# Patient Record
Sex: MALE | Race: WHITE | NOT HISPANIC OR LATINO | Employment: FULL TIME | URBAN - NONMETROPOLITAN AREA
[De-identification: names, ages, dates, MRNs, and addresses within clinical notes are randomized per-mention and may not be internally consistent; named-entity substitution may affect disease eponyms.]

---

## 2023-01-20 PROBLEM — F41.9 ANXIETY: Status: ACTIVE | Noted: 2023-01-20

## 2023-01-20 PROBLEM — R19.7 DIARRHEA: Status: ACTIVE | Noted: 2023-01-20

## 2023-01-20 PROBLEM — F41.0 PANIC ATTACK: Status: ACTIVE | Noted: 2023-01-20

## 2023-01-20 RX ORDER — ESCITALOPRAM OXALATE 5 MG/1
1 TABLET ORAL DAILY
COMMUNITY
Start: 2020-06-30 | End: 2023-03-07 | Stop reason: SDUPTHER

## 2023-01-20 RX ORDER — DESVENLAFAXINE 100 MG/1
1 TABLET, EXTENDED RELEASE ORAL DAILY
COMMUNITY
Start: 2019-06-04 | End: 2023-03-07 | Stop reason: SDUPTHER

## 2023-03-07 ENCOUNTER — OFFICE VISIT (OUTPATIENT)
Dept: PRIMARY CARE | Facility: CLINIC | Age: 28
End: 2023-03-07
Payer: COMMERCIAL

## 2023-03-07 VITALS
OXYGEN SATURATION: 98 % | HEART RATE: 61 BPM | WEIGHT: 179.6 LBS | BODY MASS INDEX: 914.6 KG/M2 | TEMPERATURE: 97.6 F | SYSTOLIC BLOOD PRESSURE: 116 MMHG | DIASTOLIC BLOOD PRESSURE: 60 MMHG

## 2023-03-07 DIAGNOSIS — Z00.00 WELL ADULT EXAM: Primary | ICD-10-CM

## 2023-03-07 DIAGNOSIS — F41.9 ANXIETY: ICD-10-CM

## 2023-03-07 DIAGNOSIS — F41.0 PANIC ATTACK: ICD-10-CM

## 2023-03-07 PROBLEM — R19.7 DIARRHEA: Status: RESOLVED | Noted: 2023-01-20 | Resolved: 2023-03-07

## 2023-03-07 PROCEDURE — 99214 OFFICE O/P EST MOD 30 MIN: CPT | Performed by: FAMILY MEDICINE

## 2023-03-07 PROCEDURE — 1036F TOBACCO NON-USER: CPT | Performed by: FAMILY MEDICINE

## 2023-03-07 RX ORDER — DESVENLAFAXINE 100 MG/1
100 TABLET, EXTENDED RELEASE ORAL DAILY
Qty: 30 TABLET | Refills: 3 | Status: SHIPPED | OUTPATIENT
Start: 2023-03-07 | End: 2023-03-27 | Stop reason: ALTCHOICE

## 2023-03-07 RX ORDER — DESVENLAFAXINE 100 MG/1
100 TABLET, EXTENDED RELEASE ORAL DAILY
Qty: 30 TABLET | Refills: 3 | Status: SHIPPED | OUTPATIENT
Start: 2023-03-07 | End: 2023-03-07 | Stop reason: SDUPTHER

## 2023-03-07 RX ORDER — DESVENLAFAXINE SUCCINATE 25 MG/1
75 TABLET, EXTENDED RELEASE ORAL DAILY
Qty: 90 TABLET | Refills: 11 | Status: SHIPPED | OUTPATIENT
Start: 2023-03-07 | End: 2023-03-07 | Stop reason: SDUPTHER

## 2023-03-07 RX ORDER — ESCITALOPRAM OXALATE 5 MG/1
5 TABLET ORAL DAILY
Qty: 30 TABLET | Refills: 11 | Status: SHIPPED | OUTPATIENT
Start: 2023-03-07 | End: 2023-03-07 | Stop reason: SDUPTHER

## 2023-03-07 RX ORDER — ESCITALOPRAM OXALATE 5 MG/1
5 TABLET ORAL DAILY
Qty: 30 TABLET | Refills: 11 | Status: SHIPPED | OUTPATIENT
Start: 2023-03-07 | End: 2023-09-12 | Stop reason: SDUPTHER

## 2023-03-07 RX ORDER — DESVENLAFAXINE SUCCINATE 25 MG/1
75 TABLET, EXTENDED RELEASE ORAL DAILY
Qty: 90 TABLET | Refills: 11 | Status: SHIPPED | OUTPATIENT
Start: 2023-03-07 | End: 2023-03-27 | Stop reason: SDUPTHER

## 2023-03-07 ASSESSMENT — PATIENT HEALTH QUESTIONNAIRE - PHQ9
1. LITTLE INTEREST OR PLEASURE IN DOING THINGS: NOT AT ALL
SUM OF ALL RESPONSES TO PHQ9 QUESTIONS 1 AND 2: 0
2. FEELING DOWN, DEPRESSED OR HOPELESS: NOT AT ALL

## 2023-03-07 ASSESSMENT — ANXIETY QUESTIONNAIRES
GAD7 TOTAL SCORE: 0
6. BECOMING EASILY ANNOYED OR IRRITABLE: NOT AT ALL
3. WORRYING TOO MUCH ABOUT DIFFERENT THINGS: NOT AT ALL
4. TROUBLE RELAXING: NOT AT ALL
IF YOU CHECKED OFF ANY PROBLEMS ON THIS QUESTIONNAIRE, HOW DIFFICULT HAVE THESE PROBLEMS MADE IT FOR YOU TO DO YOUR WORK, TAKE CARE OF THINGS AT HOME, OR GET ALONG WITH OTHER PEOPLE: NOT DIFFICULT AT ALL
7. FEELING AFRAID AS IF SOMETHING AWFUL MIGHT HAPPEN: NOT AT ALL
2. NOT BEING ABLE TO STOP OR CONTROL WORRYING: NOT AT ALL
5. BEING SO RESTLESS THAT IT IS HARD TO SIT STILL: NOT AT ALL
1. FEELING NERVOUS, ANXIOUS, OR ON EDGE: NOT AT ALL

## 2023-03-07 NOTE — PROGRESS NOTES
Subjective   Patient ID: Burgess Moody is a 27 y.o. male who presents for Follow-up (6 month followup anxiety) and Anxiety.    Anxiety        Patient reports that he is managing stress much better and would like to discuss discontinuing his anxiety medication.     Patient denies any chest pain, chest tightness, or chest pressure.     Patient reports that he is exercising regularly.     Reviewed and discussed medication list.     Review of Systems   All other systems reviewed and are negative.      Objective   /60 (BP Location: Left arm, Patient Position: Sitting, BP Cuff Size: Adult)   Pulse 61   Temp 36.4 °C (97.6 °F) (Temporal)   Wt 81.5 kg (179 lb 9.6 oz)   SpO2 98%   .60 kg/m²     Physical Exam  Vitals reviewed.         Assessment/Plan   Problem List Items Addressed This Visit          Other    Anxiety    Relevant Medications    desvenlafaxine 100 mg 24 hr tablet    desvenlafaxine succinate (Pristiq) 25 mg 24 hour tablet    escitalopram (Lexapro) 5 mg tablet    Panic attack     Other Visit Diagnoses       Well adult exam    -  Primary    Relevant Orders    CBC and Auto Differential    Comprehensive Metabolic Panel    Hemoglobin A1C    Lipid Panel    Vitamin D 1,25 Dihydroxy    TSH with reflex to Free T4 if abnormal    Follow Up In [facility] - PCP             1.  Situational anxiety    We had a good discussion regards to your symptoms.    Currently taking Pristiq 100 mg a day and Lexapro 5 mg a day.  Symptoms are very well controlled.    We had a good discussion in regards to weaning off medications.    I am recommending that you start weaning the Pristiq taking 100 mg 1 day and the next day you will take 75 mg.  You will do this for 2 weeks and send me a message.  During that time I would stay on the 5 mg of Lexapro.    During that time it is very important that you exercise 30 to 60 minutes getting a heart rate up.  Try to be outside for at least 30 minutes and natural sunlight  every day as well.  Continue getting at least 6 hours of sleep at night.  Continue to avoid high amounts of alcohol or caffeine during this period of time.  And continue with daily prayer and meditation.  Please contact me in 2 weeks with an update.  If you are going well at this lower dose we will switch down to 75 mg every day for 2 weeks still doing well we will then rotate 75 mg with 50 mg for 2 weeks and still doing well go down to 50 mg every day for 2 weeks we will need to be touching base with a message every 2 weeks.  Certainly if you are unable to wean completely down but we go to a lower dose at this time I think that would still be successful.    If you otherwise stay healthy I will see you back in 6 months but we will have either phone conversations or [employer] messages every 2 weeks going forward.     I, Senthil Mata attest that this note for 3/7/2023 accurately reflects documentation that I made (or my scribe made at my direction) in my capacity as Senthil Mata when I treated Burgess Moody.

## 2023-03-07 NOTE — PROGRESS NOTES
Mr. Burgess Moody is a 28 y/o male presenting for 6-month medication follow-up.     Patient reports that he is handing stress much better and would like to discuss discontinuing his anxiety medication.

## 2023-03-07 NOTE — PATIENT INSTRUCTIONS
1.  Situational anxiety    We had a good discussion regards to your symptoms.    Currently taking Pristiq 100 mg a day and Lexapro 5 mg a day.  Symptoms are very well controlled.    We had a good discussion in regards to weaning off medications.    I am recommending that you start weaning the Pristiq taking 100 mg 1 day and the next day you will take 75 mg.  You will do this for 2 weeks and send me a message.  During that time I would stay on the 5 mg of Lexapro.    During that time it is very important that you exercise 30 to 60 minutes getting a heart rate up.  Try to be outside for at least 30 minutes and natural sunlight every day as well.  Continue getting at least 6 hours of sleep at night.  Continue to avoid high amounts of alcohol or caffeine during this period of time.  And continue with daily prayer and meditation.  Please contact me in 2 weeks with an update.  If you are going well at this lower dose we will switch down to 75 mg every day for 2 weeks still doing well we will then rotate 75 mg with 50 mg for 2 weeks and still doing well go down to 50 mg every day for 2 weeks we will need to be touching base with a message every 2 weeks.  Certainly if you are unable to wean completely down but we go to a lower dose at this time I think that would still be successful.    If you otherwise stay healthy I will see you back in 6 months but we will have either phone conversations or [employer] messages every 2 weeks going

## 2023-03-27 ENCOUNTER — TELEPHONE (OUTPATIENT)
Dept: PRIMARY CARE | Facility: CLINIC | Age: 28
End: 2023-03-27

## 2023-03-27 DIAGNOSIS — F41.9 ANXIETY: ICD-10-CM

## 2023-03-27 RX ORDER — DESVENLAFAXINE SUCCINATE 25 MG/1
50 TABLET, EXTENDED RELEASE ORAL DAILY
Qty: 60 TABLET | Refills: 3 | Status: SHIPPED | OUTPATIENT
Start: 2023-03-27 | End: 2023-03-27 | Stop reason: SDUPTHER

## 2023-03-27 RX ORDER — DESVENLAFAXINE 50 MG/1
50 TABLET, EXTENDED RELEASE ORAL DAILY
Qty: 30 TABLET | Refills: 3 | Status: SHIPPED | OUTPATIENT
Start: 2023-03-27 | End: 2023-07-18

## 2023-03-27 NOTE — TELEPHONE ENCOUNTER
RICARDO Koo -    Desvenlafaxine 50 mg 1 every day - # 30 days (pt said last visit he is tapering down)

## 2023-04-17 ENCOUNTER — TELEPHONE (OUTPATIENT)
Dept: PRIMARY CARE | Facility: CLINIC | Age: 28
End: 2023-04-17
Payer: COMMERCIAL

## 2023-04-17 NOTE — TELEPHONE ENCOUNTER
The patient states he has been weaning off Desvenlafaxine for a month. The patient states he has been having side effects he wants to discuss with the provider. He can be reached at the number listed

## 2023-04-18 NOTE — TELEPHONE ENCOUNTER
I spoke with the patient  He is having side effects on the lower dose of the medications.  He is on 50 mg once a day and he is having increase stress and more emotional.  He is worrying about a lot of things.  I am recommending he re start 75 mg once a day send me a message in 2-3 weeks  If he needs a refill he let me know,

## 2023-07-18 DIAGNOSIS — F41.9 ANXIETY: ICD-10-CM

## 2023-07-18 RX ORDER — DESVENLAFAXINE SUCCINATE 50 MG/1
TABLET, EXTENDED RELEASE ORAL
Qty: 30 TABLET | Refills: 3 | Status: SHIPPED | OUTPATIENT
Start: 2023-07-18 | End: 2023-09-12 | Stop reason: SDUPTHER

## 2023-09-12 ENCOUNTER — OFFICE VISIT (OUTPATIENT)
Dept: PRIMARY CARE | Facility: CLINIC | Age: 28
End: 2023-09-12
Payer: COMMERCIAL

## 2023-09-12 VITALS
WEIGHT: 181.3 LBS | OXYGEN SATURATION: 97 % | HEART RATE: 65 BPM | TEMPERATURE: 98 F | BODY MASS INDEX: 923.26 KG/M2 | SYSTOLIC BLOOD PRESSURE: 102 MMHG | DIASTOLIC BLOOD PRESSURE: 65 MMHG

## 2023-09-12 DIAGNOSIS — Z00.00 WELL ADULT EXAM: ICD-10-CM

## 2023-09-12 DIAGNOSIS — F41.0 PANIC ATTACK: ICD-10-CM

## 2023-09-12 DIAGNOSIS — F41.9 ANXIETY: Primary | ICD-10-CM

## 2023-09-12 PROCEDURE — 1036F TOBACCO NON-USER: CPT | Performed by: FAMILY MEDICINE

## 2023-09-12 PROCEDURE — 99214 OFFICE O/P EST MOD 30 MIN: CPT | Performed by: FAMILY MEDICINE

## 2023-09-12 RX ORDER — DESVENLAFAXINE SUCCINATE 25 MG/1
TABLET, EXTENDED RELEASE ORAL
Qty: 270 TABLET | Refills: 0 | Status: SHIPPED | OUTPATIENT
Start: 2023-09-12 | End: 2024-03-04 | Stop reason: SDUPTHER

## 2023-09-12 RX ORDER — ESCITALOPRAM OXALATE 5 MG/1
5 TABLET ORAL DAILY
Qty: 90 TABLET | Refills: 3 | Status: SHIPPED | OUTPATIENT
Start: 2023-09-12 | End: 2024-03-19 | Stop reason: SDUPTHER

## 2023-09-12 RX ORDER — ESCITALOPRAM OXALATE 5 MG/1
5 TABLET ORAL DAILY
Qty: 30 TABLET | Refills: 11 | Status: SHIPPED | OUTPATIENT
Start: 2023-09-12 | End: 2023-09-12 | Stop reason: SDUPTHER

## 2023-09-12 ASSESSMENT — ANXIETY QUESTIONNAIRES
7. FEELING AFRAID AS IF SOMETHING AWFUL MIGHT HAPPEN: NOT AT ALL
GAD7 TOTAL SCORE: 0
3. WORRYING TOO MUCH ABOUT DIFFERENT THINGS: NOT AT ALL
4. TROUBLE RELAXING: NOT AT ALL
5. BEING SO RESTLESS THAT IT IS HARD TO SIT STILL: NOT AT ALL
6. BECOMING EASILY ANNOYED OR IRRITABLE: NOT AT ALL
1. FEELING NERVOUS, ANXIOUS, OR ON EDGE: NOT AT ALL
2. NOT BEING ABLE TO STOP OR CONTROL WORRYING: NOT AT ALL

## 2023-09-12 ASSESSMENT — PATIENT HEALTH QUESTIONNAIRE - PHQ9
3. TROUBLE FALLING OR STAYING ASLEEP OR SLEEPING TOO MUCH: NOT AT ALL
SUM OF ALL RESPONSES TO PHQ9 QUESTIONS 1 AND 2: 0
8. MOVING OR SPEAKING SO SLOWLY THAT OTHER PEOPLE COULD HAVE NOTICED. OR THE OPPOSITE, BEING SO FIGETY OR RESTLESS THAT YOU HAVE BEEN MOVING AROUND A LOT MORE THAN USUAL: NOT AT ALL
6. FEELING BAD ABOUT YOURSELF - OR THAT YOU ARE A FAILURE OR HAVE LET YOURSELF OR YOUR FAMILY DOWN: NOT AT ALL
7. TROUBLE CONCENTRATING ON THINGS, SUCH AS READING THE NEWSPAPER OR WATCHING TELEVISION: NOT AT ALL
SUM OF ALL RESPONSES TO PHQ QUESTIONS 1-9: 0
4. FEELING TIRED OR HAVING LITTLE ENERGY: NOT AT ALL
1. LITTLE INTEREST OR PLEASURE IN DOING THINGS: NOT AT ALL
9. THOUGHTS THAT YOU WOULD BE BETTER OFF DEAD, OR OF HURTING YOURSELF: NOT AT ALL
5. POOR APPETITE OR OVEREATING: NOT AT ALL
2. FEELING DOWN, DEPRESSED OR HOPELESS: NOT AT ALL

## 2023-09-12 NOTE — PATIENT INSTRUCTIONS
Anxiety    Medical discussion today regards to current symptoms and medications.    In the process of weaning down the Pristiq.    I am recommending that you start Pristiq with a 25 mg and you will take 3 of the 25 mg tablets for a total of 75 mg 2 days in a row then reduce the dose down to 50 mg for 1 day.  Then increase dosing back up to 75 mg for 2 days then back down to 50 mg for 1 day I would do this for a total of 30 days please contact me with a message at the end of those 30 days if you are doing well at that point time we will go to a 75 mg and 50 mg every other day.    Continue on Lexapro 5 mg a day    Continue getting at least 6 hours of sleep at night.    Continue with a healthy relationship with family and friends    Continue getting outside for least 30 minutes and natural sunlight every day    Continue with your excellent exercise routine if possible push that in days a week is of natural endorphins will help while you are starting to wean down off the Pristiq    Continue keeping caffeine to no more than 3 a day.  Continue to avoid tobacco and alcohol    Please keep me updated on your progress as you wean off the Pristiq we certainly can continue on the current dose if needed but our goal is to eventually wean you off completely.  I would like to see you back in 6 months for an annual wellness exam    Please have fasting labs at your convenience we will call you with those results

## 2023-09-12 NOTE — PROGRESS NOTES
Subjective   Burgess Moody is a 28 y.o. male who presents for Follow-up (Medications. ) and Flu Vaccine (Pt declines flu vaccine at this time ).  HPI  He is currelty taking Lexapro 5 mg and desvenlafaxine 75 mg. He is doing well. He would like to decrease desvenlafaxine to 50 mg. He reports that he does sometimes feel on edge more than when he was on 100 mg. He notes that he does not do well in crowds. He states that he had a couple weeks that he felt sick with anxiety. The same thing happened last year. He states that work is very busy during this time. It happened in late July early August. He is able to work through this but does have a few days where he thought about increasing his dosage. He exercises 4 days a week. He mainly weight lifts. He feels that he sleeps decently. He states that he does better if he has a routine. He drinks 3 cups of coffee a day. This helps him stay focused.       Review of Systems   All other systems reviewed and are negative.      Objective   /65 (BP Location: Right arm, Patient Position: Sitting, BP Cuff Size: Adult)   Pulse 65   Temp 36.7 °C (98 °F) (Temporal)   Wt 82.2 kg (181 lb 4.8 oz)   SpO2 97%   .26 kg/m²    Physical Exam  Vitals reviewed.   Constitutional:       Appearance: Normal appearance.   Neck:      Vascular: No carotid bruit.   Cardiovascular:      Rate and Rhythm: Normal rate and regular rhythm.      Pulses: Normal pulses.      Heart sounds: Normal heart sounds.   Pulmonary:      Effort: Pulmonary effort is normal. No respiratory distress.      Breath sounds: Normal breath sounds. No wheezing.   Abdominal:      General: There is no distension.      Palpations: Abdomen is soft. There is no mass.      Tenderness: There is no abdominal tenderness. There is no right CVA tenderness, left CVA tenderness, guarding or rebound.   Musculoskeletal:      Cervical back: Normal range of motion and neck supple. No rigidity.      Right lower leg: No edema.       Left lower leg: No edema.   Lymphadenopathy:      Cervical: No cervical adenopathy.   Neurological:      Mental Status: He is alert.         Assessment/Plan   Problem List Items Addressed This Visit       Anxiety - Primary    Relevant Medications    desvenlafaxine (Pristiq) 25 mg 24 hour tablet    escitalopram (Lexapro) 5 mg tablet    Panic attack     Other Visit Diagnoses       Well adult exam        Relevant Orders    Follow Up In [facility] - PCP - Health Maintenance        Anxiety    Medical discussion today regards to current symptoms and medications.    In the process of weaning down the Pristiq.    I am recommending that you start Pristiq with a 25 mg and you will take 3 of the 25 mg tablets for a total of 75 mg 2 days in a row then reduce the dose down to 50 mg for 1 day.  Then increase dosing back up to 75 mg for 2 days then back down to 50 mg for 1 day I would do this for a total of 30 days please contact me with a message at the end of those 30 days if you are doing well at that point time we will go to a 75 mg and 50 mg every other day.    Continue on Lexapro 5 mg a day    Continue getting at least 6 hours of sleep at night.    Continue with a healthy relationship with family and friends    Continue getting outside for least 30 minutes and natural sunlight every day    Continue with your excellent exercise routine if possible push that in days a week is of natural endorphins will help while you are starting to wean down off the Pristiq    Continue keeping caffeine to no more than 3 a day.  Continue to avoid tobacco and alcohol    Please keep me updated on your progress as you wean off the Pristiq we certainly can continue on the current dose if needed but our goal is to eventually wean you off completely.  I would like to see you back in 6 months for an annual wellness exam    Please have fasting labs at your convenience we will call you with those results     Scribe Attestation  By signing my  name below, IRamona Scribe   attest that this documentation has been prepared under the direction and in the presence of Senthil Mata MD.

## 2023-11-24 DIAGNOSIS — F41.9 ANXIETY: ICD-10-CM

## 2023-11-27 RX ORDER — DESVENLAFAXINE SUCCINATE 50 MG/1
TABLET, EXTENDED RELEASE ORAL
Qty: 30 TABLET | Refills: 11 | Status: SHIPPED | OUTPATIENT
Start: 2023-11-27 | End: 2023-12-28

## 2023-12-27 DIAGNOSIS — F41.9 ANXIETY: ICD-10-CM

## 2023-12-28 RX ORDER — DESVENLAFAXINE 50 MG/1
50 TABLET, EXTENDED RELEASE ORAL DAILY
Qty: 30 TABLET | Refills: 3 | Status: SHIPPED | OUTPATIENT
Start: 2023-12-28 | End: 2024-03-19 | Stop reason: SDUPTHER

## 2024-02-27 ENCOUNTER — TELEPHONE (OUTPATIENT)
Dept: PRIMARY CARE | Facility: CLINIC | Age: 29
End: 2024-02-27
Payer: COMMERCIAL

## 2024-02-27 DIAGNOSIS — F41.9 ANXIETY: ICD-10-CM

## 2024-02-27 NOTE — TELEPHONE ENCOUNTER
Pt called requesting refill of desvenlafaxine 25mg  Per OV Note on 09/12/2024:    He is currelty taking Lexapro 5 mg and desvenlafaxine 75 mg. He is doing well. He would like to decrease desvenlafaxine to 50 mg.     In the process of weaning down the Pristiq.     I am recommending that you start Pristiq with a 25 mg and you will take 3 of the 25 mg tablets for a total of 75 mg 2 days in a row then reduce the dose down to 50 mg for 1 day.  Then increase dosing back up to 75 mg for 2 days then back down to 50 mg for 1 day I would do this for a total of 30 days please contact me with a message at the end of those 30 days if you are doing well at that point time we will go to a 75 mg and 50 mg every other day.    Called pt and left vm msg to call back to clarify if refill is needed.

## 2024-03-04 ENCOUNTER — TELEPHONE (OUTPATIENT)
Dept: PRIMARY CARE | Facility: CLINIC | Age: 29
End: 2024-03-04

## 2024-03-04 DIAGNOSIS — J02.9 ACUTE PHARYNGITIS, UNSPECIFIED ETIOLOGY: ICD-10-CM

## 2024-03-04 DIAGNOSIS — J02.9 ACUTE PHARYNGITIS, UNSPECIFIED ETIOLOGY: Primary | ICD-10-CM

## 2024-03-04 DIAGNOSIS — F41.9 ANXIETY: ICD-10-CM

## 2024-03-04 RX ORDER — AMOXICILLIN 500 MG/1
1000 CAPSULE ORAL EVERY 12 HOURS SCHEDULED
Qty: 40 CAPSULE | Refills: 0 | Status: SHIPPED | OUTPATIENT
Start: 2024-03-04 | End: 2024-03-04 | Stop reason: SDUPTHER

## 2024-03-04 RX ORDER — DESVENLAFAXINE SUCCINATE 25 MG/1
75 TABLET, EXTENDED RELEASE ORAL DAILY
Qty: 270 TABLET | Refills: 0 | Status: SHIPPED | OUTPATIENT
Start: 2024-03-04

## 2024-03-04 RX ORDER — AMOXICILLIN 500 MG/1
1000 CAPSULE ORAL EVERY 12 HOURS SCHEDULED
Qty: 40 CAPSULE | Refills: 0 | Status: SHIPPED | OUTPATIENT
Start: 2024-03-04 | End: 2024-03-19 | Stop reason: WASHOUT

## 2024-03-04 NOTE — TELEPHONE ENCOUNTER
Patient was seen at urgent care for sore throat Saturday  (Pulled into chart)    Was prescribed prednisone for 3 days, he has to go out of town for work trip tomorrow and is asking if there is anything else he should be doing for this     Please advise

## 2024-03-04 NOTE — TELEPHONE ENCOUNTER
I called and spoke with pt. Pt states that he is currently travelling and will be in [facility]. Pt was wondering if you were able to send it to a pharmacy there? Pt was not sure of a pharmacy at this time due to travelling. I advised pt that I would ask the Dr if he was able to send this to a pharmacy there first, and when we give him a call back with an answer that he would have a pharmacy ready for us if the dr is able to send abx to a different pharmacy.

## 2024-03-04 NOTE — TELEPHONE ENCOUNTER
Patient is still taking 75mg desvenlafaxine, does not plan on weaning down to 50mg until the spring    Please send to [facility] in [place]

## 2024-03-04 NOTE — TELEPHONE ENCOUNTER
Patient called back and asked if you could send this to the [facility].

## 2024-03-04 NOTE — TELEPHONE ENCOUNTER
If his symptoms are worsening please have him start on the antibiotic I sent to the pharmacy.  If he is improving No further treatment is needed.

## 2024-03-08 ENCOUNTER — OFFICE VISIT (OUTPATIENT)
Dept: PRIMARY CARE | Facility: CLINIC | Age: 29
End: 2024-03-08
Payer: COMMERCIAL

## 2024-03-08 VITALS
WEIGHT: 187.2 LBS | OXYGEN SATURATION: 96 % | DIASTOLIC BLOOD PRESSURE: 68 MMHG | HEART RATE: 78 BPM | SYSTOLIC BLOOD PRESSURE: 117 MMHG | BODY MASS INDEX: 953.31 KG/M2 | TEMPERATURE: 99.3 F

## 2024-03-08 DIAGNOSIS — J02.9 PHARYNGITIS, UNSPECIFIED ETIOLOGY: Primary | ICD-10-CM

## 2024-03-08 PROCEDURE — 1036F TOBACCO NON-USER: CPT | Performed by: FAMILY MEDICINE

## 2024-03-08 PROCEDURE — 87635 SARS-COV-2 COVID-19 AMP PRB: CPT

## 2024-03-08 PROCEDURE — 99213 OFFICE O/P EST LOW 20 MIN: CPT | Performed by: FAMILY MEDICINE

## 2024-03-08 RX ORDER — TRETINOIN 0.25 MG/G
CREAM TOPICAL
COMMUNITY
Start: 2024-02-14

## 2024-03-08 RX ORDER — PREDNISONE 20 MG/1
TABLET ORAL
COMMUNITY
Start: 2024-03-02 | End: 2024-03-19 | Stop reason: WASHOUT

## 2024-03-08 NOTE — PROGRESS NOTES
Subjective   Patient ID: Burgess Moody is a 28 y.o. male who presents for URI (Pt states he started taking amox on Wednesday night but he is still having the same sx he was having last Saturday when he went to  /Started with s/t which is why they gave him steroids/Was traveling beginning of the week/Feels wiped out and sore throat /Reports he doesn't get sick often ).  HPI    Initial symptoms were Friday at noon. Got worse overnight.   Went to  - looked in his throat - strep test was negative.   Given steroid for redness and swelling. Felt better the next few days then Monday felt terrible with flu like symptoms (hot/cold, achy, stomach, headache, overall illness) Traveled on Monday and felt a little better by end of day, felt great Tuesday morning, driving home. Throat started hurting on the way home.   Wednesday woke up feeling ill (same flu like symptoms) - in bed on Wednesday. Started the amox wed night. Has tested for COVID and was negative. Thursday felt a little better, but still has sore throat, brain fog, sore. Still doesn't feel right. Throat is red, inflamed per patient, difficult to swallow, painful to eat.     Covid test was about 2 years old, would like to take another here  Drinking a lot of fluids, appetite has been less  No cough or congestion.  No changes in sense of taste or smell.   No diarrhea or vomiting.     Objective   Visit Vitals  /68   Pulse 78   Temp 37.4 °C (99.3 °F)   Wt 84.9 kg (187 lb 3.2 oz)   SpO2 96%   .31 kg/m²   Smoking Status Never   BSA 0.84 m²      Physical Exam  Constitutional:       General: He is not in acute distress.     Appearance: Normal appearance.   HENT:      Head: Normocephalic and atraumatic.      Right Ear: Tympanic membrane, ear canal and external ear normal.      Left Ear: Tympanic membrane, ear canal and external ear normal.      Nose: Nose normal.      Mouth/Throat:      Mouth: Mucous membranes are moist.      Pharynx: Oropharynx is clear. No  oropharyngeal exudate or posterior oropharyngeal erythema.   Eyes:      Conjunctiva/sclera: Conjunctivae normal.      Pupils: Pupils are equal, round, and reactive to light.   Cardiovascular:      Rate and Rhythm: Normal rate and regular rhythm.   Pulmonary:      Effort: Pulmonary effort is normal. No respiratory distress.      Breath sounds: No wheezing or rales.   Musculoskeletal:      Cervical back: Neck supple. No rigidity.   Lymphadenopathy:      Cervical: No cervical adenopathy.   Skin:     General: Skin is warm and dry.      Findings: No rash.   Neurological:      General: No focal deficit present.      Mental Status: He is alert.   Psychiatric:         Mood and Affect: Mood normal.         Behavior: Behavior normal.         Assessment/Plan   Problem List Items Addressed This Visit    None  Visit Diagnoses       Pharyngitis, unspecified etiology    -  Primary    Relevant Orders    Sars-CoV-2 PCR        Patient has an illness that is still likely viral, can complete the amoxicillin but no concerning physical exam findings today. COVID test to help assess his need for ongoing caution around others, but discussed this is within the realm of normal for viral illness.

## 2024-03-09 LAB — SARS-COV-2 RNA RESP QL NAA+PROBE: NOT DETECTED

## 2024-03-19 ENCOUNTER — OFFICE VISIT (OUTPATIENT)
Dept: PRIMARY CARE | Facility: CLINIC | Age: 29
End: 2024-03-19
Payer: COMMERCIAL

## 2024-03-19 VITALS
HEART RATE: 61 BPM | HEIGHT: 73 IN | OXYGEN SATURATION: 98 % | SYSTOLIC BLOOD PRESSURE: 99 MMHG | BODY MASS INDEX: 23.76 KG/M2 | WEIGHT: 179.3 LBS | TEMPERATURE: 97.7 F | DIASTOLIC BLOOD PRESSURE: 62 MMHG

## 2024-03-19 DIAGNOSIS — Z00.00 WELL ADULT EXAM: Primary | ICD-10-CM

## 2024-03-19 DIAGNOSIS — F41.9 ANXIETY: ICD-10-CM

## 2024-03-19 PROCEDURE — 1036F TOBACCO NON-USER: CPT | Performed by: FAMILY MEDICINE

## 2024-03-19 PROCEDURE — 99395 PREV VISIT EST AGE 18-39: CPT | Performed by: FAMILY MEDICINE

## 2024-03-19 RX ORDER — DESVENLAFAXINE 50 MG/1
50 TABLET, EXTENDED RELEASE ORAL DAILY
Qty: 30 TABLET | Refills: 3 | Status: SHIPPED | OUTPATIENT
Start: 2024-03-19

## 2024-03-19 RX ORDER — ESCITALOPRAM OXALATE 5 MG/1
5 TABLET ORAL DAILY
Qty: 90 TABLET | Refills: 3 | Status: SHIPPED | OUTPATIENT
Start: 2024-03-19 | End: 2025-03-19

## 2024-03-19 ASSESSMENT — ENCOUNTER SYMPTOMS
ENDOCRINE NEGATIVE: 1
PSYCHIATRIC NEGATIVE: 1
GASTROINTESTINAL NEGATIVE: 1
HEMATOLOGIC/LYMPHATIC NEGATIVE: 1
CARDIOVASCULAR NEGATIVE: 1
NEUROLOGICAL NEGATIVE: 1
RESPIRATORY NEGATIVE: 1
EYES NEGATIVE: 1
CONSTITUTIONAL NEGATIVE: 1
ALLERGIC/IMMUNOLOGIC NEGATIVE: 1
MUSCULOSKELETAL NEGATIVE: 1

## 2024-03-19 ASSESSMENT — ANXIETY QUESTIONNAIRES
2. NOT BEING ABLE TO STOP OR CONTROL WORRYING: NOT AT ALL
1. FEELING NERVOUS, ANXIOUS, OR ON EDGE: NOT AT ALL
3. WORRYING TOO MUCH ABOUT DIFFERENT THINGS: SEVERAL DAYS
IF YOU CHECKED OFF ANY PROBLEMS ON THIS QUESTIONNAIRE, HOW DIFFICULT HAVE THESE PROBLEMS MADE IT FOR YOU TO DO YOUR WORK, TAKE CARE OF THINGS AT HOME, OR GET ALONG WITH OTHER PEOPLE: NOT DIFFICULT AT ALL
GAD7 TOTAL SCORE: 1
6. BECOMING EASILY ANNOYED OR IRRITABLE: NOT AT ALL
4. TROUBLE RELAXING: NOT AT ALL
5. BEING SO RESTLESS THAT IT IS HARD TO SIT STILL: NOT AT ALL
7. FEELING AFRAID AS IF SOMETHING AWFUL MIGHT HAPPEN: NOT AT ALL

## 2024-03-19 NOTE — PROGRESS NOTES
"Subjective   Patient ID: Burgess Moody is a 28 y.o. male who presents for Annual Exam (CPE).    HPI     Anxiety:  The patient is presenting today for a follow up of anxiety disorder. He has the following anxiety symptoms: difficulty concentrating, racing thoughts. Symptoms have been not been a problem since that time. He denies having any current suicidal and/or homicidal ideation.  The patient has not been hospitalized for this in the last 6 months.  Patient denies any side effects to the medications.     The patient has been experiencing pain near his buttock side when lying down. This occurs 1-2 times in a month.    The patient denies any changes in vision, hearing or dental.     The patient maintains they do not have any chest pain, chest tightness or shortness of breath.    They do not experience nausea, emesis, changes in bowel movements or dyspepsia.    The patient's vaccinations are not up to date.      Review of Systems   Constitutional: Negative.    HENT: Negative.     Eyes: Negative.    Respiratory: Negative.     Cardiovascular: Negative.    Gastrointestinal: Negative.    Endocrine: Negative.    Genitourinary: Negative.    Musculoskeletal: Negative.    Skin: Negative.    Allergic/Immunologic: Negative.    Neurological: Negative.    Hematological: Negative.    Psychiatric/Behavioral: Negative.         Objective   BP 99/62 (BP Location: Left arm, Patient Position: Sitting, BP Cuff Size: Adult)   Pulse 61   Temp 36.5 °C (97.7 °F) (Temporal)   Ht 1.854 m (6' 1\")   Wt 81.3 kg (179 lb 4.8 oz)   SpO2 98%   BMI 23.66 kg/m²     Physical Exam  Constitutional:       Appearance: Normal appearance.   HENT:      Head: Normocephalic and atraumatic.      Nose: Nose normal.   Eyes:      Extraocular Movements: Extraocular movements intact.      Conjunctiva/sclera: Conjunctivae normal.      Pupils: Pupils are equal, round, and reactive to light.   Cardiovascular:      Rate and Rhythm: Normal rate and regular rhythm.    "   Pulses: Normal pulses.      Heart sounds: Normal heart sounds.   Pulmonary:      Effort: Pulmonary effort is normal.      Breath sounds: Normal breath sounds and air entry.   Abdominal:      General: Bowel sounds are normal.      Palpations: Abdomen is soft.   Musculoskeletal:         General: Normal range of motion.      Cervical back: Normal range of motion.   Neurological:      Mental Status: He is alert.   Psychiatric:         Mood and Affect: Mood normal.         Behavior: Behavior normal.         Thought Content: Thought content normal.         Judgment: Judgment normal.         Assessment/Plan          1. Well adult exam  Follow Up In [facility] - PCP - Health Maintenance    Vitamin D 25-Hydroxy,Total (for eval of Vitamin D levels)    Hemoglobin A1C    TSH with reflex to Free T4 if abnormal    Lipid Panel    Comprehensive Metabolic Panel    CBC and Auto Differential    Follow Up In [facility] - PCP - Health Maintenance      2. Anxiety  desvenlafaxine 50 mg 24 hr tablet    escitalopram (Lexapro) 5 mg tablet        1.  Well visit    Today in the office you had your annual wellness exam    You are up-to-date with your tetanus shot from 2023    Please have fasting labs at your convenience we will check kidney function liver function blood sugar cholesterol thyroid vitamin D we will notify you of all these results    Continue eating a heart healthy diet a good goal 5-7 servings of fresh fruit and vegetable every day in addition to lean proteins.  Try to avoid simple sugars try to avoid fast foods    Continue with your excellent exercise routine 30 to 60 minutes of exercise 5 days a week is ideal certainly if you have any chest pains with activities let me know    Very good discussion in regards to reducing your dose of the desvenlafaxine.  I would recommend in May reducing the dose from 75 mg every day to 75 mg alternating with 50 mg every other day for 2 weeks then staying on the 50 mg  going forward    Continue on current medications as prescribed by the dermatologist    You continue to have some tension headaches I will try to work on a correct workstation trying to make sure you are not leaning your head down or tilting your head down for too long a period of time if possible get a stand-up desk.    You are describing some cramping sensation in the perineum specifically when you lay down this can happen once a month I would asked that you continue to monitor how frequently as well as try to determine how long it lasts if you are well-hydrated if anything else is correlated with these crampings sensations    If all your labs returned normal and you continue to remain healthy I would like to see you back in 6 months but am happy to see sooner if needed    Follow-up in 6 months or sooner if there are any concerns.    Scribe Attestation  By signing my name below, ISosa, Scribe   attest that this documentation has been prepared under the direction and in the presence of Senthil Mata MD.    This note has been transcribed using a medical scribe and there is a possibility of unintentional typing misprints.

## 2024-03-19 NOTE — PATIENT INSTRUCTIONS
1.  Well visit    Today in the office you had your annual wellness exam    You are up-to-date with your tetanus shot from 2023    Please have fasting labs at your convenience we will check kidney function liver function blood sugar cholesterol thyroid vitamin D we will notify you of all these results    Continue eating a heart healthy diet a good goal 5-7 servings of fresh fruit and vegetable every day in addition to lean proteins.  Try to avoid simple sugars try to avoid fast foods    Continue with your excellent exercise routine 30 to 60 minutes of exercise 5 days a week is ideal certainly if you have any chest pains with activities let me know    Very good discussion in regards to reducing your dose of the desvenlafaxine.  I would recommend in May reducing the dose from 75 mg every day to 75 mg alternating with 50 mg every other day for 2 weeks then staying on the 50 mg going forward    Continue on current medications as prescribed by the dermatologist    You continue to have some tension headaches I will try to work on a correct workstation trying to make sure you are not leaning your head down or tilting your head down for too long a period of time if possible get a stand-up desk.    You are describing some cramping sensation in the perineum specifically when you lay down this can happen once a month I would asked that you continue to monitor how frequently as well as try to determine how long it lasts if you are well-hydrated if anything else is correlated with these crampings sensations    If all your labs returned normal and you continue to remain healthy I would like to see you back in 6 months but am happy to see sooner if needed

## 2024-03-27 ENCOUNTER — OFFICE VISIT (OUTPATIENT)
Dept: PRIMARY CARE | Facility: CLINIC | Age: 29
End: 2024-03-27
Payer: COMMERCIAL

## 2024-03-27 VITALS
SYSTOLIC BLOOD PRESSURE: 125 MMHG | TEMPERATURE: 98.7 F | OXYGEN SATURATION: 96 % | WEIGHT: 180 LBS | DIASTOLIC BLOOD PRESSURE: 70 MMHG | BODY MASS INDEX: 23.75 KG/M2 | HEART RATE: 70 BPM

## 2024-03-27 DIAGNOSIS — N45.1 EPIDIDYMITIS WITH NO ABSCESS: Primary | ICD-10-CM

## 2024-03-27 PROCEDURE — 99213 OFFICE O/P EST LOW 20 MIN: CPT | Performed by: FAMILY MEDICINE

## 2024-03-27 RX ORDER — DOXYCYCLINE 100 MG/1
100 CAPSULE ORAL 2 TIMES DAILY
Qty: 20 CAPSULE | Refills: 0 | Status: SHIPPED | OUTPATIENT
Start: 2024-03-27 | End: 2024-04-06

## 2024-03-27 RX ORDER — NAPROXEN 500 MG/1
500 TABLET ORAL 2 TIMES DAILY PRN
Qty: 30 TABLET | Refills: 5 | Status: SHIPPED | OUTPATIENT
Start: 2024-03-27 | End: 2024-11-22

## 2024-03-27 NOTE — PROGRESS NOTES
Subjective   Burgess Moody is a 29 y.o. male who presents for Pain (Pt states he has been having abd pain and groin pain since last Wednesday /Sx are more prominent in LLQ /Nothing seems to make it worse, sometimes can feel it more after he eats ).  HPI  He went to [place],  he had pain starting  in the scrotum then in the lower abdomen and then in the groin.  He has pain with urination and has no kidney stone and he has no blood in urine.  No constipation,  he has some discomfort in the back.  The back discomfort was across the entire low back,    Review of Systems   All other systems reviewed and are negative.      Objective   /70   Pulse 70   Temp 37.1 °C (98.7 °F)   Wt 81.6 kg (180 lb)   SpO2 96%   BMI 23.75 kg/m²    Physical Exam  Vitals reviewed.   Constitutional:       Appearance: Normal appearance. He is normal weight.   HENT:      Head: Normocephalic and atraumatic.      Right Ear: Tympanic membrane, ear canal and external ear normal.      Left Ear: Tympanic membrane, ear canal and external ear normal.      Nose: Nose normal.      Mouth/Throat:      Mouth: Mucous membranes are moist.      Pharynx: Oropharynx is clear.   Eyes:      Extraocular Movements: Extraocular movements intact.      Conjunctiva/sclera: Conjunctivae normal.      Pupils: Pupils are equal, round, and reactive to light.   Neck:      Vascular: No carotid bruit.   Cardiovascular:      Rate and Rhythm: Normal rate and regular rhythm.      Pulses: Normal pulses.      Heart sounds: Normal heart sounds. No murmur heard.  Pulmonary:      Effort: Pulmonary effort is normal. No respiratory distress.      Breath sounds: Normal breath sounds. No wheezing, rhonchi or rales.   Abdominal:      General: Abdomen is flat. Bowel sounds are normal.      Palpations: Abdomen is soft. There is no mass.      Tenderness: There is no abdominal tenderness. There is no guarding.   Genitourinary:     Penis: Normal.       Comments: Tenderness to palpate  epididymis on the left with enlargement of the epididymis on the left  Musculoskeletal:         General: No swelling or deformity. Normal range of motion.      Cervical back: Normal range of motion and neck supple.      Right lower leg: No edema.      Left lower leg: No edema.   Lymphadenopathy:      Cervical: No cervical adenopathy.   Skin:     General: Skin is warm and dry.      Capillary Refill: Capillary refill takes less than 2 seconds.   Neurological:      General: No focal deficit present.      Mental Status: He is alert and oriented to person, place, and time.   Psychiatric:         Mood and Affect: Mood normal.         Behavior: Behavior normal.         Thought Content: Thought content normal.         Judgment: Judgment normal.         Assessment/Plan   Problem List Items Addressed This Visit    None       1.  Left-sided epididymitis    My diagnosis is that you developed a small inflammation to the tube on the top of the left testicle that is continuing to be swollen and inflamed.  I am recommending you take a 10-day course of doxycycline along with naproxen 500 mg twice a day with food.    Please wear some form of underwear that is very supportive such as a jockstrap or underwear you would wear when exercising    I would avoid any trauma to the area.    Symptoms should slowly improve over the next week after 10 days all symptoms should be completely gone.  If you are still having discomfort after 10 days please contact the office at which time I would recommend an ultrasound of the testicles    It is safe to take a probiotic while taking the antibiotics to prevent any kind of diarrhea    If your symptoms worsen you will call otherwise I will see you back at your next scheduled appointment

## 2024-03-27 NOTE — PATIENT INSTRUCTIONS
1.  Left-sided epididymitis    My diagnosis is that you developed a small inflammation to the tube on the top of the left testicle that is continuing to be swollen and inflamed.  I am recommending you take a 10-day course of doxycycline along with naproxen 500 mg twice a day with food.    Please wear some form of underwear that is very supportive such as a jockstrap or underwear you would wear when exercising    I would avoid any trauma to the area.    Symptoms should slowly improve over the next week after 10 days all symptoms should be completely gone.  If you are still having discomfort after 10 days please contact the office at which time I would recommend an ultrasound of the testicles    It is safe to take a probiotic while taking the antibiotics to prevent any kind of diarrhea    If your symptoms worsen you will call otherwise I will see you back at your next scheduled appointment

## 2024-07-24 DIAGNOSIS — F41.9 ANXIETY: ICD-10-CM

## 2024-07-24 RX ORDER — DESVENLAFAXINE SUCCINATE 50 MG/1
50 TABLET, EXTENDED RELEASE ORAL DAILY
Qty: 30 TABLET | Refills: 11 | Status: SHIPPED | OUTPATIENT
Start: 2024-07-24

## 2024-10-22 ENCOUNTER — APPOINTMENT (OUTPATIENT)
Dept: PRIMARY CARE | Facility: CLINIC | Age: 29
End: 2024-10-22
Payer: COMMERCIAL

## 2024-10-31 ENCOUNTER — APPOINTMENT (OUTPATIENT)
Dept: PRIMARY CARE | Facility: CLINIC | Age: 29
End: 2024-10-31
Payer: COMMERCIAL

## 2024-10-31 VITALS
HEART RATE: 82 BPM | TEMPERATURE: 98.7 F | HEIGHT: 72 IN | SYSTOLIC BLOOD PRESSURE: 123 MMHG | WEIGHT: 182 LBS | BODY MASS INDEX: 24.65 KG/M2 | OXYGEN SATURATION: 96 % | DIASTOLIC BLOOD PRESSURE: 79 MMHG

## 2024-10-31 DIAGNOSIS — F41.9 ANXIETY: Primary | ICD-10-CM

## 2024-10-31 DIAGNOSIS — Z00.00 WELL ADULT EXAM: ICD-10-CM

## 2024-10-31 PROBLEM — N45.1 EPIDIDYMITIS WITH NO ABSCESS: Status: RESOLVED | Noted: 2024-03-27 | Resolved: 2024-10-31

## 2024-10-31 PROCEDURE — 99214 OFFICE O/P EST MOD 30 MIN: CPT | Performed by: FAMILY MEDICINE

## 2024-10-31 PROCEDURE — 1036F TOBACCO NON-USER: CPT | Performed by: FAMILY MEDICINE

## 2024-10-31 PROCEDURE — 3008F BODY MASS INDEX DOCD: CPT | Performed by: FAMILY MEDICINE

## 2024-10-31 RX ORDER — CLINDAMYCIN PHOSPHATE 10 MG/G
GEL TOPICAL
COMMUNITY
Start: 2024-05-23

## 2024-10-31 RX ORDER — ESCITALOPRAM OXALATE 5 MG/1
5 TABLET ORAL DAILY
Qty: 90 TABLET | Refills: 3 | Status: SHIPPED | OUTPATIENT
Start: 2024-10-31 | End: 2025-10-31

## 2024-10-31 RX ORDER — DESVENLAFAXINE SUCCINATE 25 MG/1
75 TABLET, EXTENDED RELEASE ORAL DAILY
Qty: 270 TABLET | Refills: 3 | Status: SHIPPED | OUTPATIENT
Start: 2024-10-31

## 2024-10-31 ASSESSMENT — ENCOUNTER SYMPTOMS
NEUROLOGICAL NEGATIVE: 1
SHORTNESS OF BREATH: 0
MUSCULOSKELETAL NEGATIVE: 1
RESPIRATORY NEGATIVE: 1
ENDOCRINE NEGATIVE: 1
CONSTITUTIONAL NEGATIVE: 1
VOMITING: 0
CARDIOVASCULAR NEGATIVE: 1
ALLERGIC/IMMUNOLOGIC NEGATIVE: 1
GASTROINTESTINAL NEGATIVE: 1
CONSTIPATION: 0
CHEST TIGHTNESS: 0
EYES NEGATIVE: 1
NAUSEA: 0
HEMATOLOGIC/LYMPHATIC NEGATIVE: 1
PSYCHIATRIC NEGATIVE: 1
DIARRHEA: 0

## 2024-10-31 ASSESSMENT — PATIENT HEALTH QUESTIONNAIRE - PHQ9
2. FEELING DOWN, DEPRESSED OR HOPELESS: NOT AT ALL
SUM OF ALL RESPONSES TO PHQ9 QUESTIONS 1 AND 2: 0
1. LITTLE INTEREST OR PLEASURE IN DOING THINGS: NOT AT ALL

## 2024-10-31 ASSESSMENT — COLUMBIA-SUICIDE SEVERITY RATING SCALE - C-SSRS
2. HAVE YOU ACTUALLY HAD ANY THOUGHTS OF KILLING YOURSELF?: NO
1. IN THE PAST MONTH, HAVE YOU WISHED YOU WERE DEAD OR WISHED YOU COULD GO TO SLEEP AND NOT WAKE UP?: NO
6. HAVE YOU EVER DONE ANYTHING, STARTED TO DO ANYTHING, OR PREPARED TO DO ANYTHING TO END YOUR LIFE?: NO

## 2024-11-29 DIAGNOSIS — F41.9 ANXIETY: ICD-10-CM

## 2024-11-29 RX ORDER — DESVENLAFAXINE SUCCINATE 25 MG/1
25 TABLET, EXTENDED RELEASE ORAL DAILY
Qty: 90 TABLET | Refills: 3 | Status: SHIPPED | OUTPATIENT
Start: 2024-11-29 | End: 2025-11-29

## 2024-11-29 RX ORDER — DESVENLAFAXINE 50 MG/1
50 TABLET, EXTENDED RELEASE ORAL DAILY
Qty: 30 TABLET | Refills: 11 | Status: SHIPPED | OUTPATIENT
Start: 2024-11-29

## 2024-11-29 NOTE — TELEPHONE ENCOUNTER
This needs to be 90 day supply confirmed dosage with pt   Stated he takes one 50 and one 25 mg please resend

## 2024-12-03 ENCOUNTER — TELEPHONE (OUTPATIENT)
Dept: PRIMARY CARE | Facility: CLINIC | Age: 29
End: 2024-12-03
Payer: COMMERCIAL

## 2024-12-03 DIAGNOSIS — F41.9 ANXIETY: ICD-10-CM

## 2024-12-03 RX ORDER — DESVENLAFAXINE SUCCINATE 25 MG/1
25 TABLET, EXTENDED RELEASE ORAL DAILY
Qty: 30 TABLET | Refills: 11 | Status: SHIPPED | OUTPATIENT
Start: 2024-12-03 | End: 2024-12-04 | Stop reason: SDUPTHER

## 2024-12-03 RX ORDER — DESVENLAFAXINE 50 MG/1
50 TABLET, EXTENDED RELEASE ORAL DAILY
Qty: 30 TABLET | Refills: 11 | Status: CANCELLED | OUTPATIENT
Start: 2024-12-03

## 2024-12-03 RX ORDER — DESVENLAFAXINE 50 MG/1
50 TABLET, EXTENDED RELEASE ORAL DAILY
Qty: 30 TABLET | Refills: 11 | Status: SHIPPED | OUTPATIENT
Start: 2024-12-03 | End: 2024-12-04 | Stop reason: SDUPTHER

## 2024-12-03 NOTE — TELEPHONE ENCOUNTER
I sent in a new prescription for both the 50 mg and the 25 mg with the instructions for him to take a total of 75 mg a day

## 2024-12-03 NOTE — TELEPHONE ENCOUNTER
Patient needs TWO things, has left several voicemails on refill line with issues not resolved    1) needs desvenlafaxine 50mg resent to [facility] for 90 day supply with refills    2) needs desvenlafaxine 25mg resent with instructions to take one daily (currently states 3), also 90 day supply    He takes one 50mg daily and one 25mg daily

## 2024-12-04 RX ORDER — DESVENLAFAXINE 50 MG/1
50 TABLET, EXTENDED RELEASE ORAL DAILY
Qty: 90 TABLET | Refills: 3 | Status: SHIPPED | OUTPATIENT
Start: 2024-12-04 | End: 2025-12-04

## 2024-12-04 RX ORDER — DESVENLAFAXINE SUCCINATE 25 MG/1
25 TABLET, EXTENDED RELEASE ORAL DAILY
Qty: 90 TABLET | Refills: 3 | Status: SHIPPED | OUTPATIENT
Start: 2024-12-04 | End: 2025-12-04

## 2025-03-04 DIAGNOSIS — F41.9 ANXIETY: ICD-10-CM

## 2025-03-04 RX ORDER — DESVENLAFAXINE 50 MG/1
50 TABLET, EXTENDED RELEASE ORAL DAILY
Qty: 90 TABLET | Refills: 3 | Status: SHIPPED | OUTPATIENT
Start: 2025-03-04 | End: 2026-03-04

## 2025-03-04 RX ORDER — DESVENLAFAXINE SUCCINATE 25 MG/1
25 TABLET, EXTENDED RELEASE ORAL DAILY
Qty: 90 TABLET | Refills: 3 | Status: SHIPPED | OUTPATIENT
Start: 2025-03-04 | End: 2026-03-04

## 2025-04-07 ENCOUNTER — TELEPHONE (OUTPATIENT)
Dept: PRIMARY CARE | Facility: CLINIC | Age: 30
End: 2025-04-07
Payer: COMMERCIAL

## 2025-04-07 DIAGNOSIS — N50.89 MASS OF TESTICLE: Primary | ICD-10-CM

## 2025-04-07 NOTE — TELEPHONE ENCOUNTER
Patient called in and wanted to know if he could get a referral for the lump in his testicle. He does have a appt on 4.21.25 wants to skip that step and wants see a specialist as soon as he gets back from his trip on April 16.

## 2025-04-07 NOTE — TELEPHONE ENCOUNTER
I placed the order for the urologist referral and I also would recommend he have an ultrasound of the scrotum prior to seeing the urologist so I placed the order for this as well

## 2025-04-08 ENCOUNTER — HOSPITAL ENCOUNTER (OUTPATIENT)
Dept: RADIOLOGY | Facility: CLINIC | Age: 30
Discharge: HOME | End: 2025-04-08
Payer: COMMERCIAL

## 2025-04-08 DIAGNOSIS — N50.89 MASS OF TESTICLE: ICD-10-CM

## 2025-04-08 PROCEDURE — 76870 US EXAM SCROTUM: CPT | Performed by: STUDENT IN AN ORGANIZED HEALTH CARE EDUCATION/TRAINING PROGRAM

## 2025-04-08 PROCEDURE — 76870 US EXAM SCROTUM: CPT

## 2025-04-21 ENCOUNTER — APPOINTMENT (OUTPATIENT)
Dept: PRIMARY CARE | Facility: CLINIC | Age: 30
End: 2025-04-21
Payer: COMMERCIAL

## 2025-04-21 ENCOUNTER — APPOINTMENT (OUTPATIENT)
Dept: UROLOGY | Facility: CLINIC | Age: 30
End: 2025-04-21
Payer: COMMERCIAL

## 2025-04-21 DIAGNOSIS — N50.3 CYST OF EPIDIDYMIS DETERMINED BY ULTRASOUND: ICD-10-CM

## 2025-04-21 DIAGNOSIS — N50.89 TESTICULAR LUMP: Primary | ICD-10-CM

## 2025-04-21 DIAGNOSIS — N41.9 PROSTATITIS, UNSPECIFIED PROSTATITIS TYPE: ICD-10-CM

## 2025-04-21 DIAGNOSIS — I86.1 VARICOCELE: ICD-10-CM

## 2025-04-21 LAB
POC APPEARANCE, URINE: CLEAR
POC BILIRUBIN, URINE: NEGATIVE
POC BLOOD, URINE: NEGATIVE
POC COLOR, URINE: YELLOW
POC GLUCOSE, URINE: NEGATIVE MG/DL
POC KETONES, URINE: NEGATIVE MG/DL
POC LEUKOCYTES, URINE: NEGATIVE
POC NITRITE,URINE: NEGATIVE
POC PH, URINE: 7 PH
POC PROTEIN, URINE: NEGATIVE MG/DL
POC SPECIFIC GRAVITY, URINE: 1.01
POC UROBILINOGEN, URINE: 0.2 EU/DL

## 2025-04-21 PROCEDURE — 1036F TOBACCO NON-USER: CPT

## 2025-04-21 PROCEDURE — 99204 OFFICE O/P NEW MOD 45 MIN: CPT

## 2025-04-21 PROCEDURE — 81003 URINALYSIS AUTO W/O SCOPE: CPT

## 2025-04-21 NOTE — PROGRESS NOTES
Subjective   Patient ID: Burgess Moody is a 30 y.o. male who presents for Mass of testicle.    HPI  Patient presents referred by PCP for testicular lump.  Scrotal US was ordered by PCP and revealed bilateral subcentimeter epididymal head cysts and a small left-sided varicocele measuring up to 3cm.  No other recent imaging or labs for review.    Patient presents with a lump on his right testicle about a month ago.  Thereafter started developing pain in his lower back, groin, abdomen.  Still having dull pain in the lower suprapubic region.  He was having frequency and urgency but that has resolved.  No dysuria.  No gross hematuria.  Lower back pain has gone away.  No rectal or perineal pain.  No fever/chills.  Around this time he went on a trip to [place].  He is sexually active, has been with his partner for 3 yrs.  No concerns for STI.  He got the ultrasound at what he considered to be the worst of his sxs and no infectious process seen.  Overall he's having minimal pain or discomfort at this time.    Review of Systems  See HPI.    Objective   Physical Exam    Alert and oriented x3  No acute distress, cooperative  Breathes easily on room air  Normal range of motion  No focal neurological deficits  Appears stated age  : Penis circumcised.  No scrotal edema, erythema, warmth.  Right epididymal cyst palpated, soft/smooth and mobile.  Left varicocele palpated, non-tender.  No testicular solid mass.    Results for orders placed or performed in visit on 04/21/25 (from the past 24 hours)   POCT UA Automated manually resulted   Result Value Ref Range    POC Color, Urine Yellow Straw, Yellow, Light-Yellow    POC Appearance, Urine Clear Clear    POC Glucose, Urine NEGATIVE NEGATIVE mg/dl    POC Bilirubin, Urine NEGATIVE NEGATIVE    POC Ketones, Urine NEGATIVE NEGATIVE mg/dl    POC Specific Gravity, Urine 1.010 1.005 - 1.035    POC Blood, Urine NEGATIVE NEGATIVE    POC PH, Urine 7.0 No Reference Range Established PH    POC  Protein, Urine NEGATIVE NEGATIVE mg/dl    POC Urobilinogen, Urine 0.2 0.2, 1.0 EU/DL    Poc Nitrite, Urine NEGATIVE NEGATIVE    POC Leukocytes, Urine NEGATIVE NEGATIVE     === 04/08/25 ===    US SCROTUM    - Impression -  1. Bilateral subcentimeter epididymal head cysts.  2. Small left-sided varicocele measuring up to 3 mm    MACRO:  None    Signed by: Fausto Parker 4/9/2025 5:38 AM  Dictation workstation:   [id number]    Assessment/Plan   Diagnoses and all orders for this visit:  Testicular lump  -     Referral to Urology  -     POCT UA Automated manually resulted  Varicocele  -     Referral to Urology; Future  Cyst of epididymis determined by ultrasound  -     Referral to Urology; Future  Prostatitis, unspecified prostatitis type    Patient presents with incidental finding on US of epididymal cysts and left varicocele with acute episode of scrotal/pelvic/lower back pain and urinary freq/urg.  No epididymitis was seen on ultrasound but I wonder if he had an episode of prostatitis.  He's feeling improved and would like to hold on an abx which I feel is appropriate but if his sxs recur over the next month we'll tx empirically for prostatitis.  As for the ultrasound findings, I will refer him to Dr. Rose Castellanos.  Patient agrees with plan and all questions answered.    PLAN:  Monitor for recurrent sxs  Fu with Dr. Jasmin Puente PA-C 04/21/25 3:04 PM

## 2025-05-06 ENCOUNTER — APPOINTMENT (OUTPATIENT)
Dept: PRIMARY CARE | Facility: CLINIC | Age: 30
End: 2025-05-06
Payer: COMMERCIAL

## 2025-05-09 ENCOUNTER — APPOINTMENT (OUTPATIENT)
Dept: PRIMARY CARE | Facility: CLINIC | Age: 30
End: 2025-05-09
Payer: COMMERCIAL

## 2025-05-20 ENCOUNTER — APPOINTMENT (OUTPATIENT)
Dept: PRIMARY CARE | Facility: CLINIC | Age: 30
End: 2025-05-20
Payer: COMMERCIAL

## 2025-05-20 VITALS
DIASTOLIC BLOOD PRESSURE: 58 MMHG | SYSTOLIC BLOOD PRESSURE: 93 MMHG | HEART RATE: 60 BPM | BODY MASS INDEX: 24.06 KG/M2 | OXYGEN SATURATION: 97 % | WEIGHT: 177.6 LBS | HEIGHT: 72 IN | TEMPERATURE: 97.1 F

## 2025-05-20 DIAGNOSIS — Z00.00 WELLNESS EXAMINATION: Primary | ICD-10-CM

## 2025-05-20 DIAGNOSIS — F41.9 ANXIETY: ICD-10-CM

## 2025-05-20 DIAGNOSIS — Z00.00 WELL ADULT EXAM: ICD-10-CM

## 2025-05-20 DIAGNOSIS — N50.3 EPIDIDYMAL CYST: ICD-10-CM

## 2025-05-20 PROCEDURE — 99395 PREV VISIT EST AGE 18-39: CPT | Performed by: FAMILY MEDICINE

## 2025-05-20 PROCEDURE — 3008F BODY MASS INDEX DOCD: CPT | Performed by: FAMILY MEDICINE

## 2025-05-20 PROCEDURE — 1036F TOBACCO NON-USER: CPT | Performed by: FAMILY MEDICINE

## 2025-05-20 ASSESSMENT — ANXIETY QUESTIONNAIRES
3. WORRYING TOO MUCH ABOUT DIFFERENT THINGS: SEVERAL DAYS
IF YOU CHECKED OFF ANY PROBLEMS ON THIS QUESTIONNAIRE, HOW DIFFICULT HAVE THESE PROBLEMS MADE IT FOR YOU TO DO YOUR WORK, TAKE CARE OF THINGS AT HOME, OR GET ALONG WITH OTHER PEOPLE: NOT DIFFICULT AT ALL
5. BEING SO RESTLESS THAT IT IS HARD TO SIT STILL: NOT AT ALL
1. FEELING NERVOUS, ANXIOUS, OR ON EDGE: NOT AT ALL
6. BECOMING EASILY ANNOYED OR IRRITABLE: SEVERAL DAYS
7. FEELING AFRAID AS IF SOMETHING AWFUL MIGHT HAPPEN: NOT AT ALL
2. NOT BEING ABLE TO STOP OR CONTROL WORRYING: NOT AT ALL
4. TROUBLE RELAXING: NOT AT ALL
GAD7 TOTAL SCORE: 2

## 2025-05-20 NOTE — PATIENT INSTRUCTIONS
VISIT SUMMARY:  Today, You had your wellness exam  You are up to date with all vaccines  We discussed your concerns about testicular cysts and associated pain, as well as your current management of depression. We reviewed your ultrasound results and your current medication regimen.    YOUR PLAN:  -EPIDIDYMAL CYSTS: Epididymal cysts are fluid-filled sacs in the epididymis, which can cause pain and discomfort. Your ultrasound showed two cysts with no signs of cancer. To manage the pain, wear tight athletic support during activities, limit self-examination to once a day, and monitor for any new symptoms like swelling, redness, or fever. Contact us if symptoms return.    -DEPRESSION: Depression is a mood disorder that you are managing with escitalopram and desvenlafaxine. Your current medication is effective, but you are considering reducing the desvenlafaxine dose. We recommend continuing your current regimen for now and discussing a gradual dose reduction after your upcoming personal events. We will revisit this plan in six months.    INSTRUCTIONS:  Please monitor your symptoms and contact us if you notice any changes or if your pain returns. Continue your current medication regimen and we will discuss any potential changes at your next visit in six months.   Please have fasting labs at your convenience.

## 2025-05-20 NOTE — PROGRESS NOTES
Subjective   Patient ID: Burgess Moody is a 30 y.o. male who presents for Annual Exam (CPE).  History of Present Illness  Burgess Moody is a 30 year old male who presents for his annual wellness exam.  He was having concerns about testicular cysts and associated pain.  He was seen by the urologist and most all symptoms are gone now.    He has been experiencing pain and a palpable sensation in the testicular area. An ultrasound identified two cysts without malignancy. The pain radiates to the abdomen, groin, and lower back, and is associated with increased urinary frequency, contributing to his anxiety.    He notes that touching the area exacerbates the pain, though it subsides shortly after. He maintains an active lifestyle, including activities like mowing and regular workouts, which may contribute to the discomfort.    He is currently on a regimen of 5 mg Lexapro and 75 mg Pristiq daily for mood management, which he has been following for two years. He feels this combination provides a balance of efficacy and minimal side effects. He is considering reducing the Pristiq dosage but is hesitant due to potential side effects and current life stressors, including an upcoming wedding.    No issues with eyesight, hearing, dental health, or gastrointestinal symptoms. Normal bowel movements and no significant changes in urinary habits, erections, skin conditions, or joint pain. He manages work-related stress without counseling or therapy.    Review of Systems    Objective     BP 93/58 (BP Location: Left arm, Patient Position: Sitting, BP Cuff Size: Adult)   Pulse 60   Temp 36.2 °C (97.1 °F) (Temporal)   Ht 1.829 m (6')   Wt 80.6 kg (177 lb 9.6 oz)   SpO2 97%   BMI 24.09 kg/m²      Physical Exam  GENERAL: Alert, cooperative, well developed, no acute distress.  HEENT: Normocephalic, normal oropharynx, moist mucous membranes, throat normal on examination.  NECK: Small lymph nodes palpable in neck.  CHEST: Clear to  auscultation bilaterally, no wheezes, rhonchi, or crackles.  CARDIOVASCULAR: Normal heart rate and rhythm, S1 and S2 normal without murmurs, no carotid bruits.  ABDOMEN: Soft, non-tender, non-distended, without organomegaly, normal bowel sounds, abdomen non-tender on palpation.  EXTREMITIES: No cyanosis or edema.  NEUROLOGICAL: Cranial nerves grossly intact, moves all extremities without gross motor or sensory deficit.    Assessment & Plan  Epididymal cysts  Epididymal cysts with associated abdominal, groin, and lower back pain. Ultrasound revealed two cysts, one palpable, with no evidence of malignancy. Symptoms have improved, and no current issues reported. Advised to monitor for recurrence and minimize self-examination to reduce irritation. Small cysts can cause significant discomfort due to the high density of nerve endings in the area.  - Advise wearing tight athletic supporter or underwear during activities like mowing to prevent jostling.  - Monitor for recurrence of symptoms and contact provider if symptoms reappear.  - Limit self-examination to once a day unless there are signs of swelling, redness, or fever.    Depression  Depression managed with escitalopram 5 mg and desvenlafaxine 75 mg. Current regimen provides optimal balance of efficacy and minimal side effects. He expresses interest in reducing desvenlafaxine dose to 50 mg but is concerned about potential side effects and timing due to upcoming personal events. Plan to consider dose reduction in the future when stressors are minimized. A gradual tapering method can help mitigate withdrawal symptoms.  - Continue escitalopram 5 mg and desvenlafaxine 75 mg as current regimen is effective.  - Consider dose reduction of desvenlafaxine to 50 mg after upcoming personal events, using a gradual tapering method (50 mg one day, 75 mg the next) for four weeks.  - Discuss potential dose reduction at next visit in six months.    1. Wellness examination   Comprehensive Metabolic Panel    Lipid Panel    CBC and Auto Differential    TSH with reflex to Free T4 if abnormal    Comprehensive Metabolic Panel    Lipid Panel    CBC and Auto Differential    TSH with reflex to Free T4 if abnormal      2. Well adult exam  Follow Up In [facility] - PCP - Health Maintenance      3. Anxiety        4. Epididymal cyst            eSnthil Mata MD     This medical note was created with the assistance of artificial intelligence (AI) for documentation purposes. The content has been reviewed and confirmed by the healthcare provider for accuracy and completeness. Patient consented to the use of audio recording and use of AI during their visit.

## 2025-12-23 ENCOUNTER — APPOINTMENT (OUTPATIENT)
Dept: PRIMARY CARE | Facility: CLINIC | Age: 30
End: 2025-12-23
Payer: COMMERCIAL